# Patient Record
Sex: FEMALE | Race: WHITE | HISPANIC OR LATINO
[De-identification: names, ages, dates, MRNs, and addresses within clinical notes are randomized per-mention and may not be internally consistent; named-entity substitution may affect disease eponyms.]

---

## 2023-09-07 ENCOUNTER — APPOINTMENT (OUTPATIENT)
Dept: ORTHOPEDIC SURGERY | Facility: CLINIC | Age: 53
End: 2023-09-07
Payer: COMMERCIAL

## 2023-09-07 VITALS — WEIGHT: 118 LBS | BODY MASS INDEX: 22.28 KG/M2 | HEIGHT: 61 IN

## 2023-09-07 DIAGNOSIS — Z78.9 OTHER SPECIFIED HEALTH STATUS: ICD-10-CM

## 2023-09-07 PROBLEM — Z00.00 ENCOUNTER FOR PREVENTIVE HEALTH EXAMINATION: Status: ACTIVE | Noted: 2023-09-07

## 2023-09-07 PROCEDURE — 72050 X-RAY EXAM NECK SPINE 4/5VWS: CPT

## 2023-09-07 PROCEDURE — 72110 X-RAY EXAM L-2 SPINE 4/>VWS: CPT

## 2023-09-07 PROCEDURE — 99203 OFFICE O/P NEW LOW 30 MIN: CPT

## 2023-09-07 NOTE — HISTORY OF PRESENT ILLNESS
[Neck] : neck [Lower back] : lower back [8] : 8 [Localized] : localized [Constant] : constant [Leisure] : leisure [Work] : work [Sleep] : sleep [Sitting] : sitting [Bending forward] : bending forward [de-identified] : 09/07/2023: JOSIANE GREEN is a 53 year y.o. female here today complaining of neck and lower back pain. Onset: a year ago. Pt denies any prior injury, her pain worsened over time.  RHDF. Neck pain through the b/l traps, intermittently down the LUE with N/T. Awakens with N/T in the L hand.  Also LBP, R>L. No clear pain, N/T down the BLE. No bb dysfunction.  [] : no [FreeTextEntry9] : Stretching

## 2023-09-07 NOTE — IMAGING
[de-identified] : CSPINE Inspection: No rash or ecchymosis Palpation:  TTP in traps, rhomboids, paracervicals ROM: Limited all planes Strength: 5/5 bilateral deltoid, biceps, triceps, wrist flexors, wrist extensors, , abductors Sensation: Sensation present to light touch bilateral C5-T1 distributions Reflexes: + Peres's bilaterally +L carpal compression  LSPINE Inspection: No rash or ecchymosis Palpation: No tenderness to palpation or spasm in bilateral thoracic and lumbar paraspinal musculature, R SI joint tenderness to palpation ROM: Full with stiffness Strength: 5/5 bilateral hip flexors, knee extensors, ankle dorsiflexors, EHL, ankle plantarflexors Sensation: Sensation present to light touch bilateral L2-S1 distributions Provocative maneuvers: Negative bilateral straight leg raise  Bilateral hips- Palpation: No tenderness to palpation over greater trochanter or IT band ROM: No pain with flexion and internal rotation [Disc space narrowing] : Disc space narrowing

## 2023-09-14 ENCOUNTER — APPOINTMENT (OUTPATIENT)
Dept: NEUROLOGY | Facility: CLINIC | Age: 53
End: 2023-09-14
Payer: COMMERCIAL

## 2023-09-14 PROCEDURE — 95912 NRV CNDJ TEST 11-12 STUDIES: CPT

## 2023-09-14 PROCEDURE — 95886 MUSC TEST DONE W/N TEST COMP: CPT

## 2023-09-25 ENCOUNTER — APPOINTMENT (OUTPATIENT)
Dept: MRI IMAGING | Facility: CLINIC | Age: 53
End: 2023-09-25
Payer: COMMERCIAL

## 2023-09-25 PROCEDURE — 72141 MRI NECK SPINE W/O DYE: CPT

## 2023-10-03 ENCOUNTER — APPOINTMENT (OUTPATIENT)
Dept: ORTHOPEDIC SURGERY | Facility: CLINIC | Age: 53
End: 2023-10-03

## 2023-10-12 ENCOUNTER — APPOINTMENT (OUTPATIENT)
Dept: ORTHOPEDIC SURGERY | Facility: CLINIC | Age: 53
End: 2023-10-12
Payer: COMMERCIAL

## 2023-10-12 DIAGNOSIS — M46.1 SACROILIITIS, NOT ELSEWHERE CLASSIFIED: ICD-10-CM

## 2023-10-12 DIAGNOSIS — M54.12 RADICULOPATHY, CERVICAL REGION: ICD-10-CM

## 2023-10-12 PROCEDURE — 99215 OFFICE O/P EST HI 40 MIN: CPT

## 2023-11-30 ENCOUNTER — TRANSCRIPTION ENCOUNTER (OUTPATIENT)
Age: 53
End: 2023-11-30

## 2023-11-30 ENCOUNTER — APPOINTMENT (OUTPATIENT)
Dept: ORTHOPEDIC SURGERY | Facility: CLINIC | Age: 53
End: 2023-11-30
Payer: COMMERCIAL

## 2023-11-30 DIAGNOSIS — M54.50 LOW BACK PAIN, UNSPECIFIED: ICD-10-CM

## 2023-11-30 DIAGNOSIS — M62.830 MUSCLE SPASM OF BACK: ICD-10-CM

## 2023-11-30 DIAGNOSIS — M54.2 CERVICALGIA: ICD-10-CM

## 2023-11-30 PROCEDURE — 99205 OFFICE O/P NEW HI 60 MIN: CPT
